# Patient Record
Sex: FEMALE | Race: BLACK OR AFRICAN AMERICAN | NOT HISPANIC OR LATINO | Employment: OTHER | URBAN - METROPOLITAN AREA
[De-identification: names, ages, dates, MRNs, and addresses within clinical notes are randomized per-mention and may not be internally consistent; named-entity substitution may affect disease eponyms.]

---

## 2018-01-23 ENCOUNTER — HOSPITAL ENCOUNTER (EMERGENCY)
Facility: HOSPITAL | Age: 73
Discharge: HOME | End: 2018-01-23
Payer: MEDICARE

## 2018-01-23 VITALS
OXYGEN SATURATION: 99 % | WEIGHT: 190 LBS | DIASTOLIC BLOOD PRESSURE: 81 MMHG | RESPIRATION RATE: 17 BRPM | HEIGHT: 66 IN | BODY MASS INDEX: 30.53 KG/M2 | SYSTOLIC BLOOD PRESSURE: 141 MMHG | TEMPERATURE: 99 F | HEART RATE: 80 BPM

## 2018-01-23 DIAGNOSIS — J20.9 ACUTE BRONCHITIS, UNSPECIFIED ORGANISM: Primary | ICD-10-CM

## 2018-01-23 DIAGNOSIS — R09.81 NASAL CONGESTION WITH RHINORRHEA: ICD-10-CM

## 2018-01-23 DIAGNOSIS — J34.89 NASAL CONGESTION WITH RHINORRHEA: ICD-10-CM

## 2018-01-23 DIAGNOSIS — R68.89 FLU-LIKE SYMPTOMS: ICD-10-CM

## 2018-01-23 LAB
FLUAV AG SPEC QL IA: NEGATIVE
FLUBV AG SPEC QL IA: NEGATIVE
SPECIMEN SOURCE: NORMAL

## 2018-01-23 PROCEDURE — 99284 EMERGENCY DEPT VISIT MOD MDM: CPT

## 2018-01-23 PROCEDURE — 25000003 PHARM REV CODE 250: Performed by: PHYSICIAN ASSISTANT

## 2018-01-23 PROCEDURE — 87400 INFLUENZA A/B EACH AG IA: CPT

## 2018-01-23 RX ORDER — PROMETHAZINE HYDROCHLORIDE AND DEXTROMETHORPHAN HYDROBROMIDE 6.25; 15 MG/5ML; MG/5ML
SYRUP ORAL
Qty: 120 ML | Refills: 0 | Status: SHIPPED | OUTPATIENT
Start: 2018-01-23 | End: 2018-09-09 | Stop reason: CLARIF

## 2018-01-23 RX ORDER — ACETAMINOPHEN 325 MG/1
650 TABLET ORAL
Status: COMPLETED | OUTPATIENT
Start: 2018-01-23 | End: 2018-01-23

## 2018-01-23 RX ORDER — AZITHROMYCIN 250 MG/1
250 TABLET, FILM COATED ORAL DAILY
Qty: 6 TABLET | Refills: 0 | Status: SHIPPED | OUTPATIENT
Start: 2018-01-23 | End: 2018-09-09 | Stop reason: CLARIF

## 2018-01-23 RX ADMIN — ACETAMINOPHEN 650 MG: 325 TABLET, FILM COATED ORAL at 06:01

## 2018-01-23 NOTE — ED PROVIDER NOTES
SCRIBE #1 NOTE: I, Dylon Hutchison, am scribing for, and in the presence of, NOLVIA Warner. I have scribed the entire note.      History      Chief Complaint   Patient presents with    General Illness     pt c/o cough productive of green sputum, fever, chills, generalized body aches x3 weeks       Review of patient's allergies indicates:   Allergen Reactions    Amlodipine Swelling    Diltiazem hcl Swelling    Hydrochlorothiazide Swelling    Lisinopril Swelling        HPI   HPI    1/23/2018, 5:20 PM   History obtained from the patient      History of Present Illness: Kvng Reyez is a 72 y.o. female patient who presents to the Emergency Department for cold/flu sxs which onset gradually 3 weeks ago. Pt reports congestion, chills, fever, productive cough with green sputum, generalized body aches, and a sore throat from coughing. Symptoms are intermittnet and moderate in severity. Sx are exacerbated by nothing and relieved by nothing. Pt reports taking zyrtec and theraflu with no relief. No other sxs reported. Pt denies getting the flu shot in 2017. Patient denies any N/V/D, abd pain, CP, SOB, chest tightness, weakness/numbness, dizziness, lightheadedness, sore throat, facial swelling, ear pain, difficulty swallowing, rhinorrhea, congestion and all other sxs at this time. No further complaints or concerns at this time.     Arrival mode: Personal vehicle    PCP: Ghassan Nieves MD       Past Medical History:  Past Medical History:   Diagnosis Date    Diabetes mellitus     DVT (deep venous thrombosis)     Hypertension     MI (myocardial infarction)     PE (pulmonary embolism)     Peripheral neuropathy        Past Surgical History:  Past Surgical History:   Procedure Laterality Date    CARDIAC CATHETERIZATION      LOUIS FILTER PLACEMENT      HYSTERECTOMY      KNEE SURGERY      LEG AMPUTATION           Family History:  Family History   Problem Relation Age of Onset    Diabetes Father     Heart  disease Father        Social History:  Social History     Social History Main Topics    Smoking status: Never Smoker    Smokeless tobacco: Never Used    Alcohol use No    Drug use: No    Sexual activity: Not on file       ROS   Review of Systems   Constitutional: Positive for chills and fever. Negative for activity change, appetite change and diaphoresis.        (+)generalized body aches   HENT: Positive for congestion. Negative for ear discharge, facial swelling, rhinorrhea, sinus pressure, sore throat and trouble swallowing.    Respiratory: Positive for cough. Negative for chest tightness and shortness of breath.    Cardiovascular: Negative for chest pain, palpitations and leg swelling.   Gastrointestinal: Negative for abdominal pain, constipation, diarrhea, nausea and vomiting.   Genitourinary: Negative for difficulty urinating and dysuria.   Musculoskeletal: Negative for back pain and neck pain.   Skin: Negative for rash.   Neurological: Negative for dizziness, weakness, numbness and headaches.   Hematological: Does not bruise/bleed easily.   Psychiatric/Behavioral: Negative for agitation and confusion.   All other systems reviewed and are negative.      Physical Exam      Initial Vitals [01/23/18 1640]   BP Pulse Resp Temp SpO2   137/79 87 18 99.1 °F (37.3 °C) 97 %      MAP       98.33          Physical Exam  Nursing Notes and Vital Signs Reviewed.  Constitutional: Patient is in no apparent distress. Well-developed and well-nourished.  Head: Atraumatic. Normocephalic.  Eyes: PERRL. EOM intact. Conjunctivae are not pale. No scleral icterus.  ENT: Mucous membranes are moist. Oropharynx is clear and symmetric without erythema or exudates. Bilateral TM's unremarkable. Swollen nasal passages. Rhinorrhea noted.   Neck: Supple. Full ROM. No lymphadenopathy.  Cardiovascular: Regular rate. Regular rhythm. No murmurs, rubs, or gallops. Distal pulses are 2+ and symmetric.  Pulmonary/Chest: No respiratory distress. No  "tachypnea or hypoxia appreciated when speaking. Clear to auscultation bilaterally. No wheezing or rales. Occasional wet cough noted.   Abdominal: Soft and non-distended. There is no tenderness. No rebound, guarding, or rigidity. Good bowel sounds.  Musculoskeletal: Moves all extremities. Status post R AKA noted. No edema. No calf L tenderness.  Skin: Warm and dry.  Neurological:  Alert, awake, and appropriate.  Normal speech.  No acute focal neurological deficits are appreciated.  Psychiatric: Normal affect. Good eye contact. Appropriate in content.    ED Course    Procedures  ED Vital Signs:  Vitals:    01/23/18 1640   BP: 137/79   Pulse: 87   Resp: 18   Temp: 99.1 °F (37.3 °C)   TempSrc: Oral   SpO2: 97%   Weight: 86.2 kg (190 lb)   Height: 5' 6" (1.676 m)       Abnormal Lab Results:  Labs Reviewed   INFLUENZA A AND B ANTIGEN        All Lab Results:  Results for orders placed or performed during the hospital encounter of 01/23/18   Influenza antigen Nasopharyngeal Swab   Result Value Ref Range    Influenza A Ag, EIA Negative Negative    Influenza B Ag, EIA Negative Negative    Flu A & B Source Nasopharyngeal Swab          Imaging Results:  Imaging Results          X-Ray Chest PA And Lateral (Final result)  Result time 01/23/18 18:03:09    Final result by Stanislaw Bear MD (01/23/18 18:03:09)                 Impression:         No acute cardiopulmonary process.            Electronically signed by: STANISLAW BEAR MD  Date:     01/23/18  Time:    18:03              Narrative:    Exam: Two-view chest radiograph    Clinical History: .  Cough    Comparison: Chest x-ray, 05/04/2014    Findings:   The lungs are clear. The cardiac silhouette is within normal limits. No pleural effusion or pneumothorax. The bones are intact.                                      The Emergency Provider reviewed the vital signs and test results, which are outlined above.    ED Discussion         ED Medication(s):  Medications   acetaminophen " tablet 650 mg (650 mg Oral Given 1/23/18 9225)       New Prescriptions    AZITHROMYCIN (Z-STEFANO) 250 MG TABLET    Take 1 tablet (250 mg total) by mouth once daily. Take first 2 tablets together, then 1 every day until finished.    PROMETHAZINE-DEXTROMETHORPHAN (PROMETHAZINE-DM) 6.25-15 MG/5 ML SYRP    Take 1 teaspoon PO q 4 hrs prn cold/flu symptoms       Follow-up Information     Ghassan Nieves MD. Schedule an appointment as soon as possible for a visit in 2 days.    Specialty:  Internal Medicine  Contact information:  [address] [facility]  [facility]  [facility] [address] [phone]             [facility] - .    Specialty:  Emergency Medicine  Why:  If symptoms worsen  Contact information:  [address]  [facility] [address] [phone]                   Medical Decision Making    Medical Decision Making:   Clinical Tests:   Lab Tests: Reviewed and Ordered  Radiological Study: Reviewed and Ordered           Scribe Attestation:   Scribe #1: I performed the above scribed service and the documentation accurately describes the services I performed. I attest to the accuracy of the note.    Attending:   Physician Attestation Statement for Scribe #1: I, NOLVIA Warner, personally performed the services described in this documentation, as scribed by Dylon Hutchison, in my presence, and it is both accurate and complete.          Clinical Impression       ICD-10-CM ICD-9-CM   1. Acute bronchitis, unspecified organism J20.9 466.0   2. Flu-like symptoms R68.89 780.99   3. Nasal congestion with rhinorrhea J34.89 478.19       Disposition:   Disposition: Discharged  Condition: Stable         Deon Lopes PA-C  01/23/18 1941

## 2018-09-09 ENCOUNTER — HOSPITAL ENCOUNTER (EMERGENCY)
Facility: HOSPITAL | Age: 73
Discharge: HOME | End: 2018-09-09
Attending: EMERGENCY MEDICINE
Payer: MEDICARE

## 2018-09-09 DIAGNOSIS — I10 ELEVATED BLOOD PRESSURE READING WITH DIAGNOSIS OF HYPERTENSION: Primary | ICD-10-CM

## 2018-09-09 DIAGNOSIS — R51.9 ACUTE NONINTRACTABLE HEADACHE, UNSPECIFIED HEADACHE TYPE: ICD-10-CM

## 2018-09-09 PROCEDURE — 99283 EMERGENCY DEPT VISIT LOW MDM: CPT

## 2018-09-09 RX ORDER — METOPROLOL SUCCINATE 200 MG/1
200 TABLET, EXTENDED RELEASE ORAL DAILY
COMMUNITY

## 2018-09-09 RX ORDER — METFORMIN HYDROCHLORIDE 1000 MG/1
1000 TABLET ORAL 2 TIMES DAILY WITH MEALS
COMMUNITY

## 2018-09-09 RX ORDER — LOSARTAN POTASSIUM 100 MG/1
100 TABLET ORAL DAILY
COMMUNITY

## 2018-09-10 VITALS
OXYGEN SATURATION: 98 % | HEART RATE: 59 BPM | TEMPERATURE: 98 F | HEIGHT: 66 IN | SYSTOLIC BLOOD PRESSURE: 166 MMHG | BODY MASS INDEX: 31.14 KG/M2 | DIASTOLIC BLOOD PRESSURE: 83 MMHG | RESPIRATION RATE: 20 BRPM

## 2018-09-10 NOTE — ED PROVIDER NOTES
SCRIBE #1 NOTE: I, Donta Angulo, am scribing for, and in the presence of, Maren Strong PA-C. I have scribed the entire note.      History      Chief Complaint   Patient presents with    Hypertension     States headace today with BP elevated. States /121 earlier today and took metoprolol and losartan twice for BP and muscle relaxant.        Review of patient's allergies indicates:   Allergen Reactions    Amlodipine Swelling    Diltiazem hcl Swelling    Hydrochlorothiazide Swelling    Lisinopril Swelling        HPI   HPI    9/9/2018, 10:28 PM   History obtained from the patient      History of Present Illness: Kvng Reyez is a 73 y.o. female patient who presents to the Emergency Department for hypertension which onset today. Pt reports that she took metoprolol and losartan this AM. She states that it her BP remained elevated all day long though. She took her BP multiple times throughout the day. She took the BP medication again to attempt lowering pressure and took a muscle relaxer, before deciding to come in. She reports a max reading of 216/121. Symptoms are constant and moderate in severity. No mitigating or exacerbating factors reported. Associated sxs include nausea earlier today and HA. She is not currently nauseated; reports headache has subsided. Patient denies any CP, palpitations, SOB, leg swelling, diaphoresis, v/d, abd pain, dizziness, weakness, numbness, visual disturbance, photophobia, and all other sxs at this time. No further complaints or concerns at this time.          Arrival mode: Personal vehicle    PCP: Jitendra Pickard MD       Past Medical History:  Past Medical History:   Diagnosis Date    Diabetes mellitus     DVT (deep venous thrombosis)     Hypertension     MI (myocardial infarction)     PE (pulmonary embolism)     Peripheral neuropathy     Renal disorder        Past Surgical History:  Past Surgical History:   Procedure Laterality Date    CARDIAC CATHETERIZATION       LOUIS FILTER PLACEMENT      HYSTERECTOMY      KNEE SURGERY      LEG AMPUTATION           Family History:  Family History   Problem Relation Age of Onset    Diabetes Father     Heart disease Father        Social History:  Social History     Tobacco Use    Smoking status: Never Smoker    Smokeless tobacco: Never Used   Substance and Sexual Activity    Alcohol use: No    Drug use: No    Sexual activity: Unknown       ROS   Review of Systems   Constitutional: Negative for chills, diaphoresis and fever.   HENT: Negative for sore throat.    Eyes: Negative for photophobia and visual disturbance.   Respiratory: Negative for shortness of breath.    Cardiovascular: Negative for chest pain, palpitations and leg swelling.        (+) Hypertension   Gastrointestinal: Positive for nausea. Negative for diarrhea and vomiting.   Genitourinary: Negative for dysuria.   Musculoskeletal: Negative for back pain.   Skin: Negative for rash.   Neurological: Positive for headaches. Negative for dizziness, weakness, light-headedness and numbness.   Hematological: Does not bruise/bleed easily.   All other systems reviewed and are negative.      Physical Exam      Initial Vitals [09/09/18 2145]   BP Pulse Resp Temp SpO2   (!) 166/83 (!) 59 20 97.8 °F (36.6 °C) (!) 94 %      MAP       --          Physical Exam  Nursing Notes and Vital Signs Reviewed.  Constitutional: Patient is in no acute distress. Well-developed and well-nourished.  Head: Atraumatic. Normocephalic.  Eyes: PERRL. EOM intact. Conjunctivae are not pale. No scleral icterus.  ENT: Mucous membranes are moist. Oropharynx is clear and symmetric.    Neck: Supple. Full ROM. No lymphadenopathy.  Cardiovascular: Regular rate. Regular rhythm. No murmurs, rubs, or gallops. Distal pulses are 2+ and symmetric.  Pulmonary/Chest: No respiratory distress. Clear to auscultation bilaterally. No wheezing or rales.  Abdominal: Soft and non-distended.  There is no tenderness.  No  "rebound, guarding, or rigidity.   Musculoskeletal: Moves all extremities. No obvious deformities. No edema. No calf tenderness.  Skin: Warm and dry.  Neurological:  Alert, awake, and appropriate.  Normal speech.  No acute focal neurological deficits are appreciated.  Psychiatric: Normal affect. Good eye contact. Appropriate in content.    ED Course    Procedures  ED Vital Signs:  Vitals:    09/09/18 2145 09/09/18 2240   BP: (!) 166/83    Pulse: (!) 59    Resp: 20    Temp: 97.8 °F (36.6 °C)    TempSrc: Oral    SpO2: (!) 94% 98%   Height: 5' 5.5" (1.664 m)                 The Emergency Provider reviewed the vital signs and test results, which are outlined above.    ED Discussion     10:44 PM: Initial encounter. Discussed with pt all pertinent ED information and results. Advised pt to keep a BP log and call to schedule an appointment with her PCP. Discussed pt dx and plan of tx. Gave pt all f/u and return to the ED instructions. All questions and concerns were addressed at this time. Pt expresses understanding of information and instructions, and is comfortable with plan to discharge. Pt is stable for discharge.    I discussed with patient and/or family/caretaker that evaluation in the ED does not suggest any emergent or life threatening medical conditions requiring immediate intervention beyond what was provided in the ED, and I believe patient is safe for discharge.  Regardless, an unremarkable evaluation in the ED does not preclude the development or presence of a serious of life threatening condition. As such, patient was instructed to return immediately for any worsening or change in current symptoms.    Pre-hypertension/Hypertension: The pt has been informed that they may have pre-hypertension or hypertension based on a blood pressure reading in the ED. I recommend that the pt call the PCP listed on their discharge instructions or a physician of their choice this week to arrange f/u for further evaluation of " possible pre-hypertension or hypertension.       ED Medication(s):  Medications - No data to display    This SmartLink is deprecated. Use AVSMEDLIST instead to display the medication list for a patient.    Follow-up Information     Jitendra Pickard MD. Schedule an appointment as soon as possible for a visit in 1 week.    Specialty:  Family Medicine  Contact information:  [address] [facility]  [address]  [place] [address]             [facility] - .    Specialty:  Emergency Medicine  Why:  If symptoms worsen  Contact information:  [address] [facility] [address] [phone]                   Medical Decision Making              Scribe Attestation:   Scribe #1: I performed the above scribed service and the documentation accurately describes the services I performed. I attest to the accuracy of the note.    Attending:   Physician Attestation Statement for Scribe #1: I, Maren Strong PA-C, personally performed the services described in this documentation, as scribed by Donta Angulo, in my presence, and it is both accurate and complete.          Clinical Impression       ICD-10-CM ICD-9-CM   1. Elevated blood pressure reading with diagnosis of hypertension I10 401.9   2. Acute nonintractable headache, unspecified headache type R51 784.0       Disposition:   Disposition: Discharged  Condition: Stable         Maren Strong PA-C  09/10/18 0302

## 2018-09-10 NOTE — DISCHARGE INSTRUCTIONS
"  Get a blood pressure monitor from your local pharmacy and check your blood pressure every morning.  Make sure your feet are flat on the floor and you have been sitting for 15 minutes before checking the blood pressure.  Follow a diet low in SODIUM.  This includes all fast food, canned foods, processed foods, and foods with preservatives such as cheese.  Eliminate caffeine and other "energy drinks"  Drink at least 64 ounces of water a day  Exercise for at least 30 minutes 5 days per week    Schedule a follow-up appointment with your PCP.  Bring your blood pressure diary with you to this appointment.    "